# Patient Record
Sex: FEMALE | Race: OTHER | HISPANIC OR LATINO | URBAN - METROPOLITAN AREA
[De-identification: names, ages, dates, MRNs, and addresses within clinical notes are randomized per-mention and may not be internally consistent; named-entity substitution may affect disease eponyms.]

---
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
## 2023-09-25 NOTE — ED ADULT TRIAGE NOTE - NSWEIGHTCALCTOOLDRUG_GEN_A_CORE
shoe.      [x] Progressing toward goals. [x] Patient would continue to benefit from skilled occupational therapy services in order to: Decrease edema that has accumulated in the extremity(ies), decrease risk of infection, improve limb ROM, increase ease and independence with ADL, educate on long term management of condition, and improve overall quality of life. [] No change. [] Treatment hold:   [] No further treatment/discharge  [] Other:    Plan for next session:      Compression bandaging  MLD  Ortho fit and train velcro compression [employer]  [employer] benefit check sent 9/18/2023  [employer] benefit check sent 9/18/2023       Pt. Education:  [x] Yes  [] No  [x] Reviewed Prior HEP/Ed  Method of Education: [] Verbal  [] Demo  [] Written  Comprehension of Education:  [x] Verbalizes/demonstrates understanding  [] Needs review  [] No understanding  Rehab potential:  [x] Good [] Fair [] Poor [] With assist from family/caregiver    Circumferential Measurements  Measurements taken from nail base of D2 digit. Measurements (cm) CM  R LE Right CM   L LE Left   Dorsum    10  10    Inframalleolar  (Y girth)     17  17.5    Supramalleolar  (ankle)   25  25.5    Distal Calf    35  36.5    Mid Calf    45  47    Proximal Calf   60  61.5    Knee    70  74    Distal thigh   80  85    Mid thigh 90       Proximal Thigh                  Initial Total 9/18/2023:  .3   314.5           Therapy Goals  LTG - To be addressed within 10 visits     Pt will demonstrate compliance of maintaining lymphedema precautions to reduce the risks of infection and further exacerbations. Pt will demonstrate independence with decongestive exercise program in order to expedite fluid rerouting. Pt will demonstrate competence with SELF MLD (Manual lymphatic drainage) in order to reroute lymphatic pathways for decreased swelling.       Pt/Caregiver will demonstrate independence with donning/doffing and wearing schedule for compression garments/ devices  used